# Patient Record
Sex: FEMALE | Race: WHITE
[De-identification: names, ages, dates, MRNs, and addresses within clinical notes are randomized per-mention and may not be internally consistent; named-entity substitution may affect disease eponyms.]

---

## 2018-07-01 ENCOUNTER — HOSPITAL ENCOUNTER (EMERGENCY)
Dept: HOSPITAL 25 - UCEAST | Age: 23
Discharge: HOME | End: 2018-07-01
Payer: COMMERCIAL

## 2018-07-01 VITALS — DIASTOLIC BLOOD PRESSURE: 84 MMHG | SYSTOLIC BLOOD PRESSURE: 131 MMHG
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
## 2018-07-01 DIAGNOSIS — S80.862A: Primary | ICD-10-CM
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
## 2018-07-01 DIAGNOSIS — S80.861A: ICD-10-CM

## 2018-07-01 PROCEDURE — G0463 HOSPITAL OUTPT CLINIC VISIT: HCPCS

## 2018-07-01 PROCEDURE — 99211 OFF/OP EST MAY X REQ PHY/QHP: CPT

## 2018-07-01 NOTE — UC
General HPI





- HPI Summary


HPI Summary: 


States she was at a [place] at an open lawn yesterday and noticed 3 tiny 

ticks attached on legs and thighs about 7 hrs after the event. She removed them 

with tweezers and took pictures next to a [place] which show ticks about 

0.8-1.2 mm in length. She did not notice any blood, swelling or rash, no pain 

or itching on site of bites. She is very concerned about Lyme's disease 





- History of Current Complaint


Chief Complaint: UCSkin


Stated Complaint: TICK BITE


Time Seen by Provider: 07/01/18 13:09


Hx Obtained From: Patient


Hx Last Menstrual Period: 3 wks ago


Onset/Duration: Sudden Onset, Lasting Days


Onset Severity: Mild


Current Severity: None


Pain Intensity: 0





- Allergy/Home Medications


Allergies/Adverse Reactions: 


 Allergies











Allergy/AdvReac Type Severity Reaction Status Date / Time


 


gluten Allergy  GI Upset Verified 07/01/18 13:01











Home Medications: 


 Home Medications





NK [No Home Medications Reported]  07/01/18 [History Confirmed 07/01/18]











PMH/Surg Hx/FS Hx/Imm Hx


Previously Healthy: Yes





- Surgical History


Surgical History: None





- Family History


Known Family History: Positive: None





- Social History


Alcohol Use: Occasionally


Substance Use Type: None


Smoking Status (MU): Never Smoked Tobacco





Review of Systems


Constitutional: Negative


All Other Systems Reviewed And Are Negative: Yes





Physical Exam


Triage Information Reviewed: Yes


Appearance: Well-Appearing, No Pain Distress, Well-Nourished


Vital Signs: 


 Initial Vital Signs











Temp  98.2 F   07/01/18 12:58


 


Pulse  84   07/01/18 12:58


 


Resp  18   07/01/18 12:58


 


BP  131/84   07/01/18 12:58


 


Pulse Ox  100   07/01/18 12:58











Vital Signs Reviewed: Yes


Eyes: Positive: Conjunctiva Clear


ENT: Positive: Hearing grossly normal


Neck: Positive: Supple, Nontender


Respiratory: Positive: Chest non-tender, No respiratory distress


Cardiovascular: Positive: Pulses Normal, Brisk Capillary Refill


Skin Exam: Normal





Course/Dx





- Course


Course Of Treatment: tick bites, patient was bitten for yesterday and detached 

the ticks about 7 hrs after exposure. Tick do not look like deer ticks. 

Discussed with patient mode of transmission of Lyme's disease and the different 

type of ticks (deer ticks) that could be the culprit. According to patient's 

history risk of Lyme is very remote





- Differential Dx - Multi-Symptom


Provider Diagnoses: tick bite





Discharge





- Sign-Out/Discharge


Documenting (check all that apply): Discharge/Admit/Transfer





- Discharge Plan


Condition: Good


Disposition: HOME


Patient Education Materials:  Tick Bite (ED)


Referrals: 


Judy Roberson MD [Primary Care Provider] - 





- Billing Disposition and Condition


Condition: GOOD


Disposition: Home